# Patient Record
(demographics unavailable — no encounter records)

---

## 2025-03-05 NOTE — REASON FOR VISIT
[Home] : at home, [unfilled] , at the time of the visit. [Medical Office: ([facility])___] : at the medical office located in  [Telephone (audio)] : This telephonic visit was provided via audio only technology. [Patient preference] : patient preference. [Verbal consent obtained from patient] : the patient, [unfilled] [Follow-up] : a follow-up of an existing diagnosis [FreeTextEntry4] : Akua THOMPSON  [FreeTextEntry1] : Last seen 6/2023 - Medication f/u - Need  -

## 2025-03-05 NOTE — ASSESSMENT
[FreeTextEntry1] : Patient is a 65 y/o with PMHx of DM, Hypothyroid, Gastroparesis, s/p hernia repair for F/U for repeat EGD for antral IM surveillance.   She had last EGD and Colonoscopy May of 2023. EGD notable for a HH, No H pylori, IM of the antrum without dysplasia. Colonoscopy notable for no polyps.    She takes pantoprazole 20 mg OD with good control of heartburn. She denied abd pain, nausea, vomiting, dysphagia, weight loss, melena, hematochezia, constipation, or diarrhea. She has not needed to take the miralax because she has changed her diet to increased fiber and water. Her Diabetes is under good control.         GERD with Antral IM - EGD 2023 without HGD - Repeat EGD with antral mapping for surveillance of antral IM for May or June of this year; risks vs benefits discussed - She was told to stop the ozempic 1 week before the procedure. - Continue PPI OD - F/U after procedure is done  IBS-C CRC screening - Continue Miralax OD prn - Colonoscopy done May 2023- good until 2028 .

## 2025-03-05 NOTE — REASON FOR VISIT
[Home] : at home, [unfilled] , at the time of the visit. [Medical Office: ([facility])___] : at the medical office located in  [Telephone (audio)] : This telephonic visit was provided via audio only technology. [Patient preference] : patient preference. [Verbal consent obtained from patient] : the patient, [unfilled] [Follow-up] : a follow-up of an existing diagnosis [FreeTextEntry4] : Akua THOMPSON  [FreeTextEntry1] : Last seen 6/2023 - Medication f/u - Need  -

## 2025-03-05 NOTE — ASSESSMENT
[FreeTextEntry1] : Patient is a 63 y/o with PMHx of DM, Hypothyroid, Gastroparesis, s/p hernia repair for F/U for repeat EGD for antral IM surveillance.   She had last EGD and Colonoscopy May of 2023. EGD notable for a HH, No H pylori, IM of the antrum without dysplasia. Colonoscopy notable for no polyps.    She takes pantoprazole 20 mg OD with good control of heartburn. She denied abd pain, nausea, vomiting, dysphagia, weight loss, melena, hematochezia, constipation, or diarrhea. She has not needed to take the miralax because she has changed her diet to increased fiber and water. Her Diabetes is under good control.         GERD with Antral IM - EGD 2023 without HGD - Repeat EGD with antral mapping for surveillance of antral IM for May or June of this year; risks vs benefits discussed - She was told to stop the ozempic 1 week before the procedure. - Continue PPI OD - F/U after procedure is done  IBS-C CRC screening - Continue Miralax OD prn - Colonoscopy done May 2023- good until 2028 .

## 2025-07-17 NOTE — REASON FOR VISIT
[Home] : at home, [unfilled] , at the time of the visit. [Medical Office: ([facility])___] : at the medical office located in  [Telephone (audio)] : This telephonic visit was provided via audio only technology. [Patient preference] : patient preference. [Verbal consent obtained from patient] : the patient, [unfilled] [Post-op/Procedure: _________] : a [unfilled] post-op/procedure visit [FreeTextEntry4] : Akua THOMPSON

## 2025-07-17 NOTE — REASON FOR VISIT
[Home] : at home, [unfilled] , at the time of the visit. [Medical Office: ([facility])___] : at the medical office located in  [Telephone (audio)] : This telephonic visit was provided via audio only technology. [Patient preference] : patient preference. [Verbal consent obtained from patient] : the patient, [unfilled] [Post-op/Procedure: _________] : a [unfilled] post-op/procedure visit [FreeTextEntry4] : Akua THOMPSON

## 2025-07-17 NOTE — HISTORY OF PRESENT ILLNESS
[FreeTextEntry1] : Patient is a 66 y/o with PMHx of DM, Hypothyroid, Gastroparesis, s/p hernia repair for F/U for repeat EGD for antral IM surveillance.   Patient states she tolerated her procedure well, with no complaints.  Patient denies any abdominal pain, nausea, vomiting, dysphagia, heart burn, unintentional weight loss, diarrhea, constipation, melena, hematochezia.     EGD 6/27/25 Irregularity in the area at and just proximal to the squamo-columnar junction. (Biopsy). Erythema in the stomach compatible with non-erosive gastritis. (Biopsy). Polyps in the Fundus and body. (Biopsy). A 8mm nodule was seen in the duodenal bulb appeared benign. Otherwise, normal duodenum (Biopsy). Ectopic Mucosa in the upper third of the esophagus. Pathology did not reveal intestinal metaplasia in biopsies.   She had EGD and Colonoscopy May of 2023. EGD notable for a HH, No H pylori, IM of the antrum without dysplasia. Colonoscopy notable for no polyps.   [de-identified] : 6/27/25

## 2025-07-17 NOTE — HISTORY OF PRESENT ILLNESS
[FreeTextEntry1] : Patient is a 64 y/o with PMHx of DM, Hypothyroid, Gastroparesis, s/p hernia repair for F/U for repeat EGD for antral IM surveillance.   Patient states she tolerated her procedure well, with no complaints.  Patient denies any abdominal pain, nausea, vomiting, dysphagia, heart burn, unintentional weight loss, diarrhea, constipation, melena, hematochezia.     EGD 6/27/25 Irregularity in the area at and just proximal to the squamo-columnar junction. (Biopsy). Erythema in the stomach compatible with non-erosive gastritis. (Biopsy). Polyps in the Fundus and body. (Biopsy). A 8mm nodule was seen in the duodenal bulb appeared benign. Otherwise, normal duodenum (Biopsy). Ectopic Mucosa in the upper third of the esophagus. Pathology did not reveal intestinal metaplasia in biopsies.   She had EGD and Colonoscopy May of 2023. EGD notable for a HH, No H pylori, IM of the antrum without dysplasia. Colonoscopy notable for no polyps.   [de-identified] : 6/27/25

## 2025-07-17 NOTE — REASON FOR VISIT
[Home] : at home, [unfilled] , at the time of the visit. [Medical Office: ([facility])___] : at the medical office located in  [Telephone (audio)] : This telephonic visit was provided via audio only technology. [Patient preference] : patient preference. [Verbal consent obtained from patient] : the patient, [unfilled] [Post-op/Procedure: _________] : a [unfilled] post-op/procedure visit [FreeTextEntry4] : Akua THOMPSON

## 2025-07-17 NOTE — ASSESSMENT
[FreeTextEntry1] : Patient is a 66 y/o with PMHx of DM, Hypothyroid, Gastroparesis, s/p hernia repair for F/U for repeat EGD for antral IM surveillance.   GERD with Antral IM - EGD 2023 without HGD - Repeat EGD with antral mapping showed no evidence of intestinal metaplasia  - Continue PPI OD  IBS-C CRC screening - Continue Miralax OD prn - Colonoscopy done May 2023- good until 2028 . Follow up in 1 year

## 2025-07-17 NOTE — ASSESSMENT
[FreeTextEntry1] : Patient is a 64 y/o with PMHx of DM, Hypothyroid, Gastroparesis, s/p hernia repair for F/U for repeat EGD for antral IM surveillance.   GERD with Antral IM - EGD 2023 without HGD - Repeat EGD with antral mapping showed no evidence of intestinal metaplasia  - Continue PPI OD  IBS-C CRC screening - Continue Miralax OD prn - Colonoscopy done May 2023- good until 2028 . Follow up in 1 year

## 2025-07-17 NOTE — HISTORY OF PRESENT ILLNESS
[FreeTextEntry1] : Patient is a 64 y/o with PMHx of DM, Hypothyroid, Gastroparesis, s/p hernia repair for F/U for repeat EGD for antral IM surveillance.   Patient states she tolerated her procedure well, with no complaints.  Patient denies any abdominal pain, nausea, vomiting, dysphagia, heart burn, unintentional weight loss, diarrhea, constipation, melena, hematochezia.     EGD 6/27/25 Irregularity in the area at and just proximal to the squamo-columnar junction. (Biopsy). Erythema in the stomach compatible with non-erosive gastritis. (Biopsy). Polyps in the Fundus and body. (Biopsy). A 8mm nodule was seen in the duodenal bulb appeared benign. Otherwise, normal duodenum (Biopsy). Ectopic Mucosa in the upper third of the esophagus. Pathology did not reveal intestinal metaplasia in biopsies.   She had EGD and Colonoscopy May of 2023. EGD notable for a HH, No H pylori, IM of the antrum without dysplasia. Colonoscopy notable for no polyps.   [de-identified] : 6/27/25